# Patient Record
Sex: FEMALE | Race: BLACK OR AFRICAN AMERICAN | NOT HISPANIC OR LATINO | URBAN - METROPOLITAN AREA
[De-identification: names, ages, dates, MRNs, and addresses within clinical notes are randomized per-mention and may not be internally consistent; named-entity substitution may affect disease eponyms.]

---

## 2019-05-21 ENCOUNTER — EMERGENCY (EMERGENCY)
Facility: HOSPITAL | Age: 24
LOS: 1 days | Discharge: HOME | End: 2019-05-21
Attending: STUDENT IN AN ORGANIZED HEALTH CARE EDUCATION/TRAINING PROGRAM
Payer: SELF-PAY

## 2019-05-21 VITALS
TEMPERATURE: 98 F | RESPIRATION RATE: 19 BRPM | SYSTOLIC BLOOD PRESSURE: 123 MMHG | HEART RATE: 85 BPM | DIASTOLIC BLOOD PRESSURE: 61 MMHG | OXYGEN SATURATION: 99 % | WEIGHT: 160.06 LBS

## 2019-05-21 LAB
ALBUMIN SERPL ELPH-MCNC: 3.9 G/DL — SIGNIFICANT CHANGE UP (ref 3.3–5.2)
ALP SERPL-CCNC: 64 U/L — SIGNIFICANT CHANGE UP (ref 40–120)
ALT FLD-CCNC: 14 U/L — SIGNIFICANT CHANGE UP
ANION GAP SERPL CALC-SCNC: 9 MMOL/L — SIGNIFICANT CHANGE UP (ref 5–17)
AST SERPL-CCNC: 15 U/L — SIGNIFICANT CHANGE UP
BASOPHILS # BLD AUTO: 0 K/UL — SIGNIFICANT CHANGE UP (ref 0–0.2)
BASOPHILS NFR BLD AUTO: 0.2 % — SIGNIFICANT CHANGE UP (ref 0–2)
BILIRUB SERPL-MCNC: 0.3 MG/DL — LOW (ref 0.4–2)
BUN SERPL-MCNC: 14 MG/DL — SIGNIFICANT CHANGE UP (ref 8–20)
CALCIUM SERPL-MCNC: 9.5 MG/DL — SIGNIFICANT CHANGE UP (ref 8.6–10.2)
CHLORIDE SERPL-SCNC: 101 MMOL/L — SIGNIFICANT CHANGE UP (ref 98–107)
CO2 SERPL-SCNC: 25 MMOL/L — SIGNIFICANT CHANGE UP (ref 22–29)
CREAT SERPL-MCNC: 0.77 MG/DL — SIGNIFICANT CHANGE UP (ref 0.5–1.3)
EOSINOPHIL # BLD AUTO: 0 K/UL — SIGNIFICANT CHANGE UP (ref 0–0.5)
EOSINOPHIL NFR BLD AUTO: 0.8 % — SIGNIFICANT CHANGE UP (ref 0–6)
GLUCOSE SERPL-MCNC: 93 MG/DL — SIGNIFICANT CHANGE UP (ref 70–115)
HCG SERPL-ACNC: <4 MIU/ML — SIGNIFICANT CHANGE UP
HCT VFR BLD CALC: 39.9 % — SIGNIFICANT CHANGE UP (ref 37–47)
HGB BLD-MCNC: 13.4 G/DL — SIGNIFICANT CHANGE UP (ref 12–16)
LYMPHOCYTES # BLD AUTO: 1.3 K/UL — SIGNIFICANT CHANGE UP (ref 1–4.8)
LYMPHOCYTES # BLD AUTO: 21.4 % — SIGNIFICANT CHANGE UP (ref 20–55)
MCHC RBC-ENTMCNC: 31 PG — SIGNIFICANT CHANGE UP (ref 27–31)
MCHC RBC-ENTMCNC: 33.6 G/DL — SIGNIFICANT CHANGE UP (ref 32–36)
MCV RBC AUTO: 92.4 FL — SIGNIFICANT CHANGE UP (ref 81–99)
MONOCYTES # BLD AUTO: 0.5 K/UL — SIGNIFICANT CHANGE UP (ref 0–0.8)
MONOCYTES NFR BLD AUTO: 8 % — SIGNIFICANT CHANGE UP (ref 3–10)
NEUTROPHILS # BLD AUTO: 4.4 K/UL — SIGNIFICANT CHANGE UP (ref 1.8–8)
NEUTROPHILS NFR BLD AUTO: 69.3 % — SIGNIFICANT CHANGE UP (ref 37–73)
PLATELET # BLD AUTO: 266 K/UL — SIGNIFICANT CHANGE UP (ref 150–400)
POTASSIUM SERPL-MCNC: 4.9 MMOL/L — SIGNIFICANT CHANGE UP (ref 3.5–5.3)
POTASSIUM SERPL-SCNC: 4.9 MMOL/L — SIGNIFICANT CHANGE UP (ref 3.5–5.3)
PROT SERPL-MCNC: 7.1 G/DL — SIGNIFICANT CHANGE UP (ref 6.6–8.7)
RBC # BLD: 4.32 M/UL — LOW (ref 4.4–5.2)
RBC # FLD: 13.6 % — SIGNIFICANT CHANGE UP (ref 11–15.6)
SODIUM SERPL-SCNC: 135 MMOL/L — SIGNIFICANT CHANGE UP (ref 135–145)
TROPONIN T SERPL-MCNC: <0.01 NG/ML — SIGNIFICANT CHANGE UP (ref 0–0.06)
WBC # BLD: 6.3 K/UL — SIGNIFICANT CHANGE UP (ref 4.8–10.8)
WBC # FLD AUTO: 6.3 K/UL — SIGNIFICANT CHANGE UP (ref 4.8–10.8)

## 2019-05-21 PROCEDURE — 70450 CT HEAD/BRAIN W/O DYE: CPT | Mod: 26

## 2019-05-21 PROCEDURE — 71250 CT THORAX DX C-: CPT | Mod: 26

## 2019-05-21 PROCEDURE — 93010 ELECTROCARDIOGRAM REPORT: CPT

## 2019-05-21 PROCEDURE — 72125 CT NECK SPINE W/O DYE: CPT | Mod: 26

## 2019-05-21 PROCEDURE — 99284 EMERGENCY DEPT VISIT MOD MDM: CPT

## 2019-05-21 RX ORDER — OXYCODONE AND ACETAMINOPHEN 5; 325 MG/1; MG/1
1 TABLET ORAL ONCE
Refills: 0 | Status: DISCONTINUED | OUTPATIENT
Start: 2019-05-21 | End: 2019-05-21

## 2019-05-21 RX ADMIN — OXYCODONE AND ACETAMINOPHEN 1 TABLET(S): 5; 325 TABLET ORAL at 10:30
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
## 2019-05-21 NOTE — ED PROVIDER NOTE - NS ED ROS FT
No fever/chills, No photophobia/eye pain/changes in vision, No ear pain/sore throat/dysphagia, + chest pain no palpitations, no SOB/cough/wheeze/stridor, No abdominal pain, No N/V/D, no dysuria/frequency/discharge, + neck/back pain, no rash, no changes in neurological status/function.   +headache

## 2019-05-21 NOTE — ED PROVIDER NOTE - CLINICAL SUMMARY MEDICAL DECISION MAKING FREE TEXT BOX
labs and imaging reviewed. Pt feeling much better. Pt reassured and instructed to f/up with pcp in 1-2 days. instructed to return for worsening pain, sob, numbness/weakness, or any other concerns.  Pt given a copy of all results and instructed to f/up with pcp regarding any abnormal results.

## 2019-05-21 NOTE — ED PROVIDER NOTE - PHYSICAL EXAMINATION
Constitutional - well-developed; well nourished. Head - NCAT. Airway patent. Eyes - PERRL. CV - RRR. no murmur. no edema. +reproducible chest ttp. Pulm - CTAB. Abd - soft, nt. no rebound. no guarding. Neuro - A&Ox3. strength 5/5 x4. sensation intact x4. normal gait. Skin - No rash. MSK - normal ROM.
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
## 2019-05-22 PROCEDURE — 84484 ASSAY OF TROPONIN QUANT: CPT

## 2019-05-22 PROCEDURE — 80053 COMPREHEN METABOLIC PANEL: CPT

## 2019-05-22 PROCEDURE — 70450 CT HEAD/BRAIN W/O DYE: CPT

## 2019-05-22 PROCEDURE — 93005 ELECTROCARDIOGRAM TRACING: CPT

## 2019-05-22 PROCEDURE — 99284 EMERGENCY DEPT VISIT MOD MDM: CPT | Mod: 25

## 2019-05-22 PROCEDURE — 85027 COMPLETE CBC AUTOMATED: CPT

## 2019-05-22 PROCEDURE — 72125 CT NECK SPINE W/O DYE: CPT

## 2019-05-22 PROCEDURE — 36415 COLL VENOUS BLD VENIPUNCTURE: CPT

## 2019-05-22 PROCEDURE — 84702 CHORIONIC GONADOTROPIN TEST: CPT

## 2019-05-22 PROCEDURE — 71250 CT THORAX DX C-: CPT

## 2021-09-27 NOTE — ED PROVIDER NOTE - CONDITION AT DISCHARGE:
Strep A PCR - We will call you in one hour. IF positive, then antibiotics. IF negative, conservative management.    Urgent Care will contact with either positive or negative COVID results in 20 minutes.    Please contact Physician referral services to follow up with a specialist, as we discussed  [phone]    PLEASE FOLLOW UP WITH YOUR PRIMARY CARE PHYSICIAN IN REGARD TO THIS URGENT CARE VISIT.    TREATMENT PLAN FOR TODAY'S VISIT:      1.   For nasal discharge or postnasal drip: Flonase 2 puffs in each nostril once daily.     2. For cough, consider OTC ROBITUSSIN DM 15 mL every 4 hours (maximum: 6 doses/24 hours)    3.  Humidifier 24/7, keep the head of the bed elevated.    Conservative measures first.....    Treatment of sore throat-go in step-wise fashion  1.  OTC Cepachol lozenges  2.  OTC Chloraseptic spray  3.  Warm salt water gargles  4.  Even [employer] chicken noodle soup (the regular kind)-The salt in the soup will alleviate the pain like salt water gargles would, but then I also can ensure that you are drinking and eating also.    Nasal Congestion-Neti rinses   A neti pot is a container designed to rinse debris or mucus from your nasal cavity. You might use a neti pot to treat symptoms of nasal allergies, sinus problems or colds.    If you choose to make your own saltwater solution, it's important to use bottled water that has been distilled or sterilized. Tap water is acceptable if it's been passed through a filter with a pore size of 1 micron or smaller or if it's been boiled for several minutes and then left to cool until it's lukewarm.    To use the neti pot, tilt your head sideways over the sink and place the spout of the neti pot in the upper nostril. Breathing through your open mouth, gently pour the saltwater solution into your upper nostril so that the liquid drains through the lower nostril. Repeat on the other side.    Be sure to rinse the irrigation device after each use with similarly  distilled, sterile, previously boiled and cooled, or filtered water and leave open to air-dry.    Neti pots are often available in pharmacies, health food stores and online. Other devices, such as squeeze bottles and pressurized canisters, also can be used to rinse or irrigate the nasal passages      Fever/aches and pains   Maximum Acetaminophen 3000 mg/24 hours.        Sore Throat     What is a sore throat?   Sore throat is a common symptom that ranges in severity from just a sense of scratchiness to severe pain.  Pharyngitis is the medical term for sore throat.     What is the cause?  Sore throat is caused by irritation of the throat. A sore throat may be the first symptom of a usually mild illness, like a cold or the flu. Sometimes it is a symptom of more severe illness, like mononucleosis.  A sore throat that comes on suddenly is called acute pharyngitis. It can be caused by bacteria or viruses. A sore throat that lasts for a long time is called chronic pharyngitis. It happens when an infection of the airways, sinuses, or mouth spreads to the throat.  Other causes of sore throat include:  • Hay fever   • Cigarette smoking or secondhand smoke   • Breathing heavily polluted air or chemical fumes   • Swallowing sharp foods that hurt the lining of the throat, such as a tortilla chip   • Dry air   • Heartburn (gastric reflux)   • Postnasal drip from draining sinuses     What are the symptoms?   Symptoms may include:  • A raw feeling in the throat that makes breathing, swallowing, and speaking painful   • Redness of the throat   • Fever   • Hoarseness   • Pus in your throat   • Tender, swollen glands (lymph nodes) in your neck   • Earache (you may feel pain in your ears even though the problem is in your throat)     How is it diagnosed?   Your healthcare provider will ask about your recent medical history and your symptoms and examine your throat. Your provider also will examine you for signs of other illness, such as  sinus, chest, or ear infections.  Just by looking at your throat, it is often hard for your healthcare provider to decide whether a virus or bacteria is causing your sore throat. Your provider may swab your throat to test for strep infection.    How is it treated?   Usually no specific medical treatment is needed if a virus is causing the sore throat. The throat most often gets better on its own within 5 to 7 days. Antibiotic medicine does not cure viral pharyngitis.  For acute pharyngitis caused by bacteria, your healthcare provider may prescribe an antibiotic.  For chronic pharyngitis, your provider will look for other causes, such as allergies.    How long will the effects last?   Viral pharyngitis often goes away in 5 to 7 days.  If you have bacterial pharyngitis, you will feel better after you have taken antibiotics for 2 to 3 days. You must, though, take all of your antibiotic even when you are feeling better. If you don't take all of it, your sore throat could come back.    How can I take care of myself?   • Don't smoke.   • Avoid secondhand smoke and other air pollutants.   • Use a cool mist humidifier to add moisture to the air.   • Get plenty of rest.   • You may want to rest your throat by talking less and eating a diet that is mostly liquid or soft for a day or two. Avoid salty or spicy foods and citrus fruits.   • Nonprescription throat lozenges and mouthwashes should help relieve the soreness.   • Gargling with warm saltwater and drinking warm liquids may help (you can make a saltwater solution by adding 1/4 teaspoon of salt to 8 ounces, or 240 mL, of warm water).   • A nonprescription pain reliever such as aspirin, acetaminophen, or ibuprofen may ease general aches and pains. Check with your healthcare provider before you give any medicine that contains aspirin or salicylates to a child or teen. This includes medicines like baby aspirin, some cold medicines, and Pepto-Bismol. Children and teens who  take aspirin are at risk for a serious illness called Reye's syndrome.     If a sore throat lasts for more than a few days, call your healthcare provider.    How can I prevent a sore throat?   The following suggestions may help prevent a sore throat:  • Don't share eating and drinking utensils with others.   • Wash your hands often.   • Don't let your nose or mouth touch public telephones or drinking fountains.   • Avoid close contact with other people who have a sore throat.   • Stay indoors as much as possible on high-pollution days.   • Don't stay in areas where there is heavy smoke from cigarettes.   • Use a humidifier in your home if the air is quite dry.        Improved